# Patient Record
Sex: FEMALE | Race: WHITE | URBAN - METROPOLITAN AREA
[De-identification: names, ages, dates, MRNs, and addresses within clinical notes are randomized per-mention and may not be internally consistent; named-entity substitution may affect disease eponyms.]

---

## 2020-02-26 ENCOUNTER — NEW PATIENT (OUTPATIENT)
Dept: URBAN - METROPOLITAN AREA CLINIC 60 | Facility: CLINIC | Age: 70
End: 2020-02-26
Payer: COMMERCIAL

## 2020-02-26 DIAGNOSIS — H18.51 FUCHS' DYSTROPHY: ICD-10-CM

## 2020-02-26 PROCEDURE — 92025 CPTRIZED CORNEAL TOPOGRAPHY: CPT | Performed by: OPTOMETRIST

## 2020-02-26 PROCEDURE — 92004 COMPRE OPH EXAM NEW PT 1/>: CPT | Performed by: OPTOMETRIST

## 2020-02-26 PROCEDURE — 92015 DETERMINE REFRACTIVE STATE: CPT | Performed by: OPTOMETRIST

## 2020-02-26 ASSESSMENT — VISUAL ACUITY
OS: 20/20
OD: 20/20

## 2020-02-26 ASSESSMENT — INTRAOCULAR PRESSURE
OD: 16
OS: 14

## 2021-04-08 ENCOUNTER — OFFICE VISIT (OUTPATIENT)
Dept: URBAN - METROPOLITAN AREA CLINIC 60 | Facility: CLINIC | Age: 71
End: 2021-04-08
Payer: COMMERCIAL

## 2021-04-08 DIAGNOSIS — D31.32 BENIGN NEOPLASM OF LEFT CHOROID: ICD-10-CM

## 2021-04-08 DIAGNOSIS — H43.391 OTHER VITREOUS OPACITIES, RIGHT EYE: ICD-10-CM

## 2021-04-08 DIAGNOSIS — H25.13 AGE-RELATED NUCLEAR CATARACT, BILATERAL: Primary | ICD-10-CM

## 2021-04-08 PROCEDURE — 92025 CPTRIZED CORNEAL TOPOGRAPHY: CPT | Performed by: OPTOMETRIST

## 2021-04-08 PROCEDURE — 92014 COMPRE OPH EXAM EST PT 1/>: CPT | Performed by: OPTOMETRIST

## 2021-04-08 ASSESSMENT — INTRAOCULAR PRESSURE
OS: 13
OD: 15

## 2021-04-08 ASSESSMENT — VISUAL ACUITY
OS: 20/20
OD: 20/20

## 2021-04-08 NOTE — IMPRESSION/PLAN
Impression: Benign neoplasm of left choroid: D31.32. Plan: Patient educated on findings. Unable to photo document, nevus is too far out in periphery.

## 2021-04-08 NOTE — IMPRESSION/PLAN
Impression: Endothelial corneal dystrophy, bilateral: H18.513. Plan: Patient educated on findings. Nii done today to monitor for progression. No need for surgical intervention at this time.

## 2021-04-29 ENCOUNTER — OFFICE VISIT (OUTPATIENT)
Dept: URBAN - METROPOLITAN AREA CLINIC 60 | Facility: CLINIC | Age: 71
End: 2021-04-29
Payer: COMMERCIAL

## 2021-04-29 DIAGNOSIS — H52.03 HYPERMETROPIA, BILATERAL: Primary | ICD-10-CM

## 2021-04-29 PROCEDURE — 92014 COMPRE OPH EXAM EST PT 1/>: CPT | Performed by: OPTOMETRIST

## 2021-04-29 ASSESSMENT — VISUAL ACUITY
OS: 20/20
OD: 20/20

## 2021-04-29 ASSESSMENT — INTRAOCULAR PRESSURE
OD: 15
OS: 14

## 2022-04-11 ENCOUNTER — OFFICE VISIT (OUTPATIENT)
Dept: URBAN - METROPOLITAN AREA CLINIC 60 | Facility: CLINIC | Age: 72
End: 2022-04-11
Payer: COMMERCIAL

## 2022-04-11 DIAGNOSIS — H52.4 PRESBYOPIA: ICD-10-CM

## 2022-04-11 DIAGNOSIS — H00.015 HORDEOLUM EXTERNUM LEFT LOWER EYELID: ICD-10-CM

## 2022-04-11 DIAGNOSIS — H04.123 TEAR FILM INSUFFICIENCY OF BILATERAL LACRIMAL GLANDS: ICD-10-CM

## 2022-04-11 DIAGNOSIS — H52.03 HYPERMETROPIA, BILATERAL: ICD-10-CM

## 2022-04-11 DIAGNOSIS — H18.513 FUCHS' DYSTROPHY: ICD-10-CM

## 2022-04-11 DIAGNOSIS — D31.32 BENIGN NEOPLASM OF LEFT CHOROID: ICD-10-CM

## 2022-04-11 DIAGNOSIS — H25.13 AGE-RELATED NUCLEAR CATARACT, BILATERAL: Primary | ICD-10-CM

## 2022-04-11 PROCEDURE — 99214 OFFICE O/P EST MOD 30 MIN: CPT | Performed by: OPTOMETRIST

## 2022-04-11 PROCEDURE — 92025 CPTRIZED CORNEAL TOPOGRAPHY: CPT | Performed by: OPTOMETRIST

## 2022-04-11 RX ORDER — PROPYLENE GLYCOL 0.06 MG/ML
0.6 % SOLUTION/ DROPS OPHTHALMIC
Qty: 2 | Refills: 0 | Status: ACTIVE
Start: 2022-04-11

## 2022-04-11 ASSESSMENT — VISUAL ACUITY
OS: 20/25
OD: 20/20

## 2022-04-11 ASSESSMENT — INTRAOCULAR PRESSURE
OS: 14
OD: 14

## 2022-04-11 NOTE — IMPRESSION/PLAN
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 by patient's complaint Plan: Recommend patient use artificial tears as directed. Sample of Systane Complete given.

## 2022-04-11 NOTE — IMPRESSION/PLAN
Impression: Benign neoplasm of left choroid: D31.32. Plan: Patient educated on findings. Unable to photo document, nevus is too far out in periphery. Stable.

## 2022-04-11 NOTE — IMPRESSION/PLAN
Impression: Hordeolum externum left lower eyelid: H00.015. Plan: Patient was educated on diagnosis. No surgical intervention needed at this time. Recommend warm compresses 2-3 times daily for about 15 minutes each time. If no improvement or worsens will consider referral to Dr Sharad Guo for evaluation and treatment.

## 2022-04-11 NOTE — IMPRESSION/PLAN
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 Plan: Patient educated on findings. Nii done today to monitor for progression. No need for surgical intervention at this time.

## 2023-09-29 ENCOUNTER — OFFICE VISIT (OUTPATIENT)
Dept: URBAN - METROPOLITAN AREA CLINIC 60 | Facility: CLINIC | Age: 73
End: 2023-09-29
Payer: COMMERCIAL

## 2023-09-29 DIAGNOSIS — H18.513 FUCHS' DYSTROPHY: ICD-10-CM

## 2023-09-29 DIAGNOSIS — H25.13 AGE-RELATED NUCLEAR CATARACT, BILATERAL: Primary | ICD-10-CM

## 2023-09-29 DIAGNOSIS — H52.03 HYPERMETROPIA, BILATERAL: ICD-10-CM

## 2023-09-29 DIAGNOSIS — D31.32 BENIGN NEOPLASM OF LEFT CHOROID: ICD-10-CM

## 2023-09-29 PROCEDURE — 92014 COMPRE OPH EXAM EST PT 1/>: CPT | Performed by: OPTOMETRIST

## 2023-09-29 ASSESSMENT — INTRAOCULAR PRESSURE
OD: 16
OS: 16

## 2023-09-29 ASSESSMENT — VISUAL ACUITY
OS: 20/20
OD: 20/20